# Patient Record
Sex: FEMALE
[De-identification: names, ages, dates, MRNs, and addresses within clinical notes are randomized per-mention and may not be internally consistent; named-entity substitution may affect disease eponyms.]

---

## 2024-04-29 ENCOUNTER — APPOINTMENT (OUTPATIENT)
Dept: BARIATRICS | Facility: CLINIC | Age: 74
End: 2024-04-29
Payer: MEDICARE

## 2024-04-29 VITALS
WEIGHT: 181 LBS | OXYGEN SATURATION: 98 % | DIASTOLIC BLOOD PRESSURE: 83 MMHG | BODY MASS INDEX: 34.17 KG/M2 | TEMPERATURE: 97.3 F | HEART RATE: 78 BPM | SYSTOLIC BLOOD PRESSURE: 124 MMHG | HEIGHT: 61 IN

## 2024-04-29 DIAGNOSIS — Z80.3 FAMILY HISTORY OF MALIGNANT NEOPLASM OF BREAST: ICD-10-CM

## 2024-04-29 DIAGNOSIS — Z84.1 FAMILY HISTORY OF DISORDERS OF KIDNEY AND URETER: ICD-10-CM

## 2024-04-29 DIAGNOSIS — R06.02 SHORTNESS OF BREATH: ICD-10-CM

## 2024-04-29 DIAGNOSIS — E66.9 OBESITY, UNSPECIFIED: ICD-10-CM

## 2024-04-29 DIAGNOSIS — D86.9 SARCOIDOSIS, UNSPECIFIED: ICD-10-CM

## 2024-04-29 DIAGNOSIS — M54.30 SCIATICA, UNSPECIFIED SIDE: ICD-10-CM

## 2024-04-29 DIAGNOSIS — Z83.49 FAMILY HISTORY OF OTHER ENDOCRINE, NUTRITIONAL AND METABOLIC DISEASES: ICD-10-CM

## 2024-04-29 DIAGNOSIS — S39.012A STRAIN OF MUSCLE, FASCIA AND TENDON OF LOWER BACK, INITIAL ENCOUNTER: ICD-10-CM

## 2024-04-29 DIAGNOSIS — J45.909 UNSPECIFIED ASTHMA, UNCOMPLICATED: ICD-10-CM

## 2024-04-29 DIAGNOSIS — Z82.49 FAMILY HISTORY OF ISCHEMIC HEART DISEASE AND OTHER DISEASES OF THE CIRCULATORY SYSTEM: ICD-10-CM

## 2024-04-29 DIAGNOSIS — I26.99 OTHER PULMONARY EMBOLISM W/OUT ACUTE COR PULMONALE: ICD-10-CM

## 2024-04-29 DIAGNOSIS — I10 ESSENTIAL (PRIMARY) HYPERTENSION: ICD-10-CM

## 2024-04-29 DIAGNOSIS — Z00.00 ENCOUNTER FOR GENERAL ADULT MEDICAL EXAMINATION W/OUT ABNORMAL FINDINGS: ICD-10-CM

## 2024-04-29 DIAGNOSIS — E78.00 PURE HYPERCHOLESTEROLEMIA, UNSPECIFIED: ICD-10-CM

## 2024-04-29 PROCEDURE — 99204 OFFICE O/P NEW MOD 45 MIN: CPT

## 2024-04-29 NOTE — HISTORY OF PRESENT ILLNESS
[de-identified] : Pt is a 73 y/o F with pmhx of obesity BMI 34, sarcoidosis following pulmonary and cardiology not on any medications, hx of hysterectomy and L partial nephrectomy for RCC ( L large lower abdominal incision) who presents today feeling well here initial consultation for bariatric sx. Pt reports a longstanding hx of obesity and states she has tried and failed various attempts at wt loss. Denies any smoking, alcohol, or drug use. Denies any GERD. Pt expresses interest in the VSG and understands to qualify for surgery, her BMI needs to be at least 35. Pt to send us a new weigh in next week. We discussed at length surgical and non-surgical options and that non surgical approaches are unlikely to lead to long term, sustained weight loss. We also discussed that surgery alone is unlikely to be successful but should rather be seen as a tool for weight loss to be integrated with physical activity and nutritional counseling. The patient verbalized understanding and agrees to proceed with the evaluation. All risks of surgery were explained to the patient including the risks of leaks, infections, blood clots and death.

## 2024-04-29 NOTE — PLAN
[FreeTextEntry1] : pt to provide us new weigh in via emial next week pt interested in VSG preop cardio and pulmonary clearance UGI

## 2024-04-29 NOTE — ASSESSMENT
[FreeTextEntry1] : Pt is a 75 y/o F with pmhx of obesity BMI 34, sarcoidosis following pulmonary and cardiology not on any medications, hx of hysterectomy and L partial nephrectomy for RCC ( L large lower abdominal incision) who presents today feeling well here initial consultation for bariatric sx. Pt reports a longstanding hx of obesity and states she has tried and failed various attempts at wt loss. Denies any smoking, alcohol, or drug use. Denies any GERD. Pt expresses interest in the VSG and understands to qualify for surgery, her BMI needs to be at least 35. Pt to send us a new weigh in next week. We discussed at length surgical and non-surgical options and that non surgical approaches are unlikely to lead to long term, sustained weight loss. We also discussed that surgery alone is unlikely to be successful but should rather be seen as a tool for weight loss to be integrated with physical activity and nutritional counseling. The patient verbalized understanding and agrees to proceed with the evaluation. All risks of surgery were explained to the patient including the risks of leaks, infections, blood clots and death.

## 2024-04-29 NOTE — ADDENDUM
[FreeTextEntry1] : It was my pleasure to interact with Ms. JIM ESCOBEDO today. In summary, Ms. JIM ESCOBEDO has morbid obesity refractory to medical and dietary efforts for which She could benefit from weight loss surgery.  We discussed in detail the Eva-en-Y gastric bypass, sleeve gastrectomy, and modified duodenal switch (ALEXUS/SIPS). She understood that these procedures are done primarily minimally invasively (laparoscopically or robotically), but that there is a possibility of conversion to laparotomy. In this particular case, with their body mass index we discussed realistic expectations with respect to weight loss.  Approximately 50% of excess weight will be lost if She has gastric bypass or sleeve gastrectomy, or, approximately 60% if She  has ALEXUS/SIPS.  In order to maintain weight loss or lose more weight, She will be required to modify diet and exercise habits (the "tool" concept of weight loss surgery).  We discussed the necessity for continuous, life-long medical care following surgery and the necessity for taking mineral and vitamin supplements daily for the rest of life. We discussed the importance of high protein diet and daily exercise for maximal success.  We discussed complications that may follow bariatric surgery that include, but are not limited to, respiratory problems, pneumonia, thrombophlebitis, and pulmonary embolus.  We also discussed intra-abdominal complications including anastomotic leak, intra-abdominal infections, portal vein thrombosis and death that may result from bariatric surgery.  We discussed that there may be other complications related to a specific type of surgery, such as that gastric bypass patients may have severe dumping secondary to dietary indiscretion. With respect to sleeve gastrectomy we discussed the chances of having refractory GERD that may require intervention in the future including conversion to gastric bypass. We also discussed postoperative DVT prophylaxis to decrease the incidence of deep vein thrombosis when indicated.   The prolonged discussion (greater than 45 minutes) centered primarily on the indications for surgery and evaluation of the risk/benefit ratio for Ms. JIM ESCOBEDO and other weight loss alternatives. I answered all questions about bariatric surgery and possible complications to the best of my ability.  Once the preoperative evaluation is complete, I will review the chart and schedule the patient for a follow-up visit in order to answer any questions prior to scheduling surgery.  Plan: Nutrition, Psych evaluations Cardiac, pulmonary evaluations Upper GI series Interested in sleeve  I, Dr. Hayes Roblero, spent 50 minutes with the patient >50% counseling/coordination of care including, reviewing the patient's history, performing an examination, reviewing relevant labs and radiographic imaging, reviewing PCP and consultant notes, discussion of medical and surgical management of the diagnosis as well as associated risks and benefits, and completing documentation.

## 2024-05-15 ENCOUNTER — NON-APPOINTMENT (OUTPATIENT)
Age: 74
End: 2024-05-15

## 2024-05-15 ENCOUNTER — APPOINTMENT (OUTPATIENT)
Dept: HEART AND VASCULAR | Facility: CLINIC | Age: 74
End: 2024-05-15
Payer: MEDICARE

## 2024-05-15 VITALS
TEMPERATURE: 97.2 F | SYSTOLIC BLOOD PRESSURE: 148 MMHG | BODY MASS INDEX: 34.28 KG/M2 | HEIGHT: 61 IN | OXYGEN SATURATION: 96 % | HEART RATE: 76 BPM | WEIGHT: 181.56 LBS | DIASTOLIC BLOOD PRESSURE: 81 MMHG

## 2024-05-15 DIAGNOSIS — Z01.810 ENCOUNTER FOR PREPROCEDURAL CARDIOVASCULAR EXAMINATION: ICD-10-CM

## 2024-05-15 PROCEDURE — 99204 OFFICE O/P NEW MOD 45 MIN: CPT | Mod: 25

## 2024-05-15 PROCEDURE — 93000 ELECTROCARDIOGRAM COMPLETE: CPT | Mod: NC

## 2024-05-15 NOTE — PHYSICAL EXAM
[General Appearance - Well Developed] : well developed [Normal Appearance] : normal appearance [Well Groomed] : well groomed [General Appearance - Well Nourished] : well nourished [No Deformities] : no deformities [General Appearance - In No Acute Distress] : no acute distress [Normal Conjunctiva] : the conjunctiva exhibited no abnormalities [Eyelids - No Xanthelasma] : the eyelids demonstrated no xanthelasmas [Normal Oral Mucosa] : normal oral mucosa [No Oral Pallor] : no oral pallor [No Oral Cyanosis] : no oral cyanosis [Normal Jugular Venous A Waves Present] : normal jugular venous A waves present [Normal Jugular Venous V Waves Present] : normal jugular venous V waves present [No Jugular Venous Garrett A Waves] : no jugular venous garrett A waves [Respiration, Rhythm And Depth] : normal respiratory rhythm and effort [Auscultation Breath Sounds / Voice Sounds] : lungs were clear to auscultation bilaterally [Exaggerated Use Of Accessory Muscles For Inspiration] : no accessory muscle use [Heart Rate And Rhythm] : heart rate and rhythm were normal [Heart Sounds] : normal S1 and S2 [Murmurs] : no murmurs present [Abdomen Soft] : soft [Abdomen Tenderness] : non-tender [Abdomen Mass (___ Cm)] : no abdominal mass palpated [Abnormal Walk] : normal gait [Gait - Sufficient For Exercise Testing] : the gait was sufficient for exercise testing [Nail Clubbing] : no clubbing of the fingernails [Cyanosis, Localized] : no localized cyanosis [Petechial Hemorrhages (___cm)] : no petechial hemorrhages [Skin Color & Pigmentation] : normal skin color and pigmentation [] : no rash [No Venous Stasis] : no venous stasis [Skin Lesions] : no skin lesions [No Skin Ulcers] : no skin ulcer [No Xanthoma] : no  xanthoma was observed [Oriented To Time, Place, And Person] : oriented to person, place, and time [Affect] : the affect was normal [Mood] : the mood was normal [No Anxiety] : not feeling anxious [FreeTextEntry1] : + pitting ankle edema on bilateral lower ex

## 2024-05-15 NOTE — HISTORY OF PRESENT ILLNESS
[Preoperative Visit] : for a medical evaluation prior to surgery [Scheduled Procedure ___] : a [unfilled] [Date of Surgery ___] : on [unfilled] [Surgeon Name ___] : surgeon: [unfilled] [Good] : Good [Chest Pain] : chest pain [Prior Anesthesia] : Prior anesthesia [Electrocardiogram] : ~T an ECG ~C was performed [Echocardiogram] : ~T an echocardiogram ~C was performed [Metabolic Capacity ___Mets%] : The patient has a metabolic capacity of [unfilled] Mets%  [FreeTextEntry1] : 74F with a hx of systemic sarcoidosis (diagnosed in 1988) with ophthalmic, cardiac and pulmonary involvement. She has pulmonary HTN and follows a pulmonary doc (Robin Be at [facility]). She also follows Dr Murtaza Santiago cardiologist at [facility]. She takes crestor for HLD.  S/p partial L nephrectomy for a tumor - no known recurrence. Functional capacity: walks 20K steps per day. Feels fine but needs to stop every now and then to catch her breath. [place] subway [place] that gives her a pulling sensation in the middle of her chest. Sometimes feels like an electric shock. No recent ischemia eval to her knowledge. Review of results from her MyChart also did not show any work up for ischemia. TTE 2/2024 ([facility]): mild LVH  1.2-1.4 cm, LVEF 60%, normal RV function, mild AI CT-PE 2/2024 ([facility]): No PE. Subpleural reticulations, sequelae of pulm sarcoid. Small nodules.  CMP 2/2024 ([facility]): normal Reports recent blood work in April at PCP's office. Not available for review at this time.  ECG 5/15/24: normal, sinus

## 2024-05-15 NOTE — DISCUSSION/SUMMARY
[Procedure Intermediate Risk] : the procedure risk is intermediate [Patient Intermediate Risk] : the patient is an intermediate risk [Additional Diagnostics Recommended] : additional diagnostics recommended [As per surgery] : as per surgery [Continue] : Continue medications as currently directed [FreeTextEntry1] : Risks and benefits regarding diagnostic plan was discussed with the patient who voiced understanding and agreement to proceed. If significant CAD is found and requires PCI, she will plan this out with her [facility]ologist. Will fax over test result.

## 2024-05-15 NOTE — ASSESSMENT
[FreeTextEntry1] : Full clearance to be given after CCTA. Also recommend her to see her PCP, pulmonologist and cardiologist closer to the date of her procedure, which she plans to get done in the fall.

## 2024-05-15 NOTE — REVIEW OF SYSTEMS
[Chest Discomfort] : chest discomfort [Negative] : Heme/Lymph [Lower Ext Edema] : lower extremity edema [FreeTextEntry5] : dependent edema resolves with leg elevation

## 2024-05-19 LAB
ANION GAP SERPL CALC-SCNC: 13 MMOL/L
BUN SERPL-MCNC: 12 MG/DL
CALCIUM SERPL-MCNC: 10.1 MG/DL
CHLORIDE SERPL-SCNC: 106 MMOL/L
CO2 SERPL-SCNC: 23 MMOL/L
CREAT SERPL-MCNC: 0.79 MG/DL
EGFR: 78 ML/MIN/1.73M2
GLUCOSE SERPL-MCNC: 82 MG/DL
POTASSIUM SERPL-SCNC: 4.2 MMOL/L
SODIUM SERPL-SCNC: 143 MMOL/L

## 2024-06-05 ENCOUNTER — APPOINTMENT (OUTPATIENT)
Dept: HEART AND VASCULAR | Facility: CLINIC | Age: 74
End: 2024-06-05
Payer: MEDICARE

## 2024-06-05 VITALS
TEMPERATURE: 97.7 F | DIASTOLIC BLOOD PRESSURE: 81 MMHG | BODY MASS INDEX: 34.4 KG/M2 | WEIGHT: 182.19 LBS | HEART RATE: 82 BPM | HEIGHT: 61 IN | OXYGEN SATURATION: 98 % | SYSTOLIC BLOOD PRESSURE: 144 MMHG

## 2024-06-05 DIAGNOSIS — R03.0 ELEVATED BLOOD-PRESSURE READING, W/OUT DIAGNOSIS OF HYPERTENSION: ICD-10-CM

## 2024-06-05 DIAGNOSIS — R07.9 CHEST PAIN, UNSPECIFIED: ICD-10-CM

## 2024-06-05 PROCEDURE — 99213 OFFICE O/P EST LOW 20 MIN: CPT

## 2024-06-05 RX ORDER — ROSUVASTATIN CALCIUM 5 MG/1
TABLET, FILM COATED ORAL
Refills: 0 | Status: ACTIVE | COMMUNITY

## 2024-06-05 RX ORDER — GABAPENTIN 100 MG/1
CAPSULE ORAL
Refills: 0 | Status: ACTIVE | COMMUNITY

## 2024-06-05 NOTE — REVIEW OF SYSTEMS
[Chest Discomfort] : chest discomfort [Negative] : Heme/Lymph [Lower Ext Edema] : no extremity edema [FreeTextEntry5] : dependent edema resolves with leg elevation

## 2024-06-05 NOTE — HISTORY OF PRESENT ILLNESS
[FreeTextEntry1] : 74F with a hx of systemic sarcoidosis (diagnosed in 1988) with ophthalmic, cardiac and pulmonary involvement. She has pulmonary HTN and follows a pulmonary doc (Robin Be at [facility]). She also follows Dr Murtaza Santiago cardiologist at [place]. She takes crestor for HLD. S/p partial L nephrectomy for a tumor - no known recurrence. Functional capacity: walks 20K steps per day. Feels fine but needs to stop every now and then to catch her breath. [place] that gives her a pulling sensation in the middle of her chest. Sometimes feels like an electric shock. No recent ischemia eval to her knowledge. Review of results from her MyChart also did not show any work up for ischemia.  5/19-27 went on a cruise (had a lot of fun, went with a choir group) and was very active (danced, no chest pain) - pending CCTA

## 2024-06-05 NOTE — REASON FOR VISIT
[Symptom and Test Evaluation] : symptom and test evaluation [FreeTextEntry1] : TTE 2/2024 ([facility]): mild LVH 1.2-1.4 cm, LVEF 60%, normal RV function, mild AI CT-PE 2/2024 ([facility]): No PE. Subpleural reticulations, sequelae of pulm sarcoid. Small nodules. CMP 2/2024 ([facility]): normal Reports recent blood work in April at PCP's office. Not available for review at this time. ECG 5/15/24: normal, sinus

## 2024-06-06 LAB
ANION GAP SERPL CALC-SCNC: 14 MMOL/L
BUN SERPL-MCNC: 11 MG/DL
CALCIUM SERPL-MCNC: 10.2 MG/DL
CHLORIDE SERPL-SCNC: 105 MMOL/L
CO2 SERPL-SCNC: 21 MMOL/L
CREAT SERPL-MCNC: 0.66 MG/DL
EGFR: 92 ML/MIN/1.73M2
GLUCOSE SERPL-MCNC: 104 MG/DL
POTASSIUM SERPL-SCNC: 3.9 MMOL/L
SODIUM SERPL-SCNC: 140 MMOL/L

## 2025-01-24 ENCOUNTER — NON-APPOINTMENT (OUTPATIENT)
Age: 75
End: 2025-01-24